# Patient Record
Sex: FEMALE | Race: WHITE | URBAN - METROPOLITAN AREA
[De-identification: names, ages, dates, MRNs, and addresses within clinical notes are randomized per-mention and may not be internally consistent; named-entity substitution may affect disease eponyms.]

---

## 2018-02-12 PROBLEM — G89.29 CHRONIC PAIN OF RIGHT KNEE: Status: ACTIVE | Noted: 2018-02-12

## 2018-02-12 PROBLEM — M85.80 OSTEOPENIA, UNSPECIFIED LOCATION: Status: ACTIVE | Noted: 2018-02-12

## 2018-02-12 PROBLEM — E55.9 VITAMIN D DEFICIENCY: Status: ACTIVE | Noted: 2018-02-12

## 2018-02-12 PROBLEM — A60.00 GENITAL HERPES SIMPLEX, UNSPECIFIED SITE: Status: ACTIVE | Noted: 2018-02-12

## 2018-02-12 PROBLEM — M25.561 CHRONIC PAIN OF RIGHT KNEE: Status: ACTIVE | Noted: 2018-02-12

## 2019-02-05 ENCOUNTER — WALK IN (OUTPATIENT)
Dept: URGENT CARE | Age: 69
End: 2019-02-05

## 2019-02-05 DIAGNOSIS — Z23 NEED FOR INFLUENZA VACCINATION: Primary | ICD-10-CM

## 2019-02-05 PROCEDURE — 90686 IIV4 VACC NO PRSV 0.5 ML IM: CPT | Performed by: NURSE PRACTITIONER

## 2019-02-05 PROCEDURE — G0008 ADMIN INFLUENZA VIRUS VAC: HCPCS | Performed by: NURSE PRACTITIONER

## 2019-02-21 PROCEDURE — 81001 URINALYSIS AUTO W/SCOPE: CPT | Performed by: INTERNAL MEDICINE

## 2019-05-23 ENCOUNTER — TELEPHONE (OUTPATIENT)
Dept: INTERNAL MEDICINE CLINIC | Facility: CLINIC | Age: 69
End: 2019-05-23

## 2019-05-23 NOTE — TELEPHONE ENCOUNTER
Pt requests mammogram order is sent to [facility], fax# [phone], so she can schedule her mammogram

## 2019-05-23 NOTE — TELEPHONE ENCOUNTER
Mammogram order faxed to [facility] as requested by patient, confirmation received. Left message on patient cell (OK per HIPPA) notifying her that order was faxed. Encouraged patient to call [facility] to confirm they received the order.  Advised patient in VM

## 2019-06-24 ENCOUNTER — TELEPHONE (OUTPATIENT)
Dept: INTERNAL MEDICINE CLINIC | Facility: CLINIC | Age: 69
End: 2019-06-24

## 2019-06-24 DIAGNOSIS — G89.29 CHRONIC LOW BACK PAIN WITH SCIATICA, SCIATICA LATERALITY UNSPECIFIED, UNSPECIFIED BACK PAIN LATERALITY: Primary | ICD-10-CM

## 2019-06-24 DIAGNOSIS — M54.40 CHRONIC LOW BACK PAIN WITH SCIATICA, SCIATICA LATERALITY UNSPECIFIED, UNSPECIFIED BACK PAIN LATERALITY: Primary | ICD-10-CM

## 2019-06-24 NOTE — TELEPHONE ENCOUNTER
[facility] calling to get an order faxed to them for PT.     Fax # [phone]  Phone # [phone]

## 2019-06-24 NOTE — TELEPHONE ENCOUNTER
To Dr. Emmanuel Broussard----    Referral pended for your review. Please send back to RN pool to fax.

## 2020-02-25 ENCOUNTER — LAB ENCOUNTER (OUTPATIENT)
Dept: LAB | Age: 70
End: 2020-02-25
Attending: INTERNAL MEDICINE
Payer: MEDICARE

## 2020-02-25 ENCOUNTER — OFFICE VISIT (OUTPATIENT)
Dept: INTERNAL MEDICINE CLINIC | Facility: CLINIC | Age: 70
End: 2020-02-25
Payer: MEDICARE

## 2020-02-25 VITALS
SYSTOLIC BLOOD PRESSURE: 130 MMHG | BODY MASS INDEX: 26.39 KG/M2 | HEIGHT: 61.69 IN | TEMPERATURE: 97 F | WEIGHT: 143.38 LBS | OXYGEN SATURATION: 98 % | HEART RATE: 80 BPM | DIASTOLIC BLOOD PRESSURE: 82 MMHG

## 2020-02-25 DIAGNOSIS — E55.9 VITAMIN D DEFICIENCY: ICD-10-CM

## 2020-02-25 DIAGNOSIS — A60.00 GENITAL HERPES SIMPLEX, UNSPECIFIED SITE: ICD-10-CM

## 2020-02-25 DIAGNOSIS — Z00.00 PHYSICAL EXAM: ICD-10-CM

## 2020-02-25 DIAGNOSIS — M85.80 OSTEOPENIA, UNSPECIFIED LOCATION: ICD-10-CM

## 2020-02-25 DIAGNOSIS — Z00.00 PHYSICAL EXAM: Primary | ICD-10-CM

## 2020-02-25 DIAGNOSIS — E78.5 HYPERLIPIDEMIA, UNSPECIFIED HYPERLIPIDEMIA TYPE: ICD-10-CM

## 2020-02-25 LAB
ALBUMIN SERPL-MCNC: 4.4 G/DL (ref 3.4–5)
ALBUMIN/GLOB SERPL: 1.4 {RATIO} (ref 1–2)
ALP LIVER SERPL-CCNC: 72 U/L (ref 55–142)
ALT SERPL-CCNC: 23 U/L (ref 13–56)
ANION GAP SERPL CALC-SCNC: 3 MMOL/L (ref 0–18)
AST SERPL-CCNC: 18 U/L (ref 15–37)
BASOPHILS # BLD AUTO: 0.07 X10(3) UL (ref 0–0.2)
BASOPHILS NFR BLD AUTO: 1.5 %
BILIRUB SERPL-MCNC: 0.6 MG/DL (ref 0.1–2)
BUN BLD-MCNC: 18 MG/DL (ref 7–18)
BUN/CREAT SERPL: 20.9 (ref 10–20)
CALCIUM BLD-MCNC: 9.8 MG/DL (ref 8.5–10.1)
CHLORIDE SERPL-SCNC: 107 MMOL/L (ref 98–112)
CHOLEST SMN-MCNC: 223 MG/DL (ref ?–200)
CO2 SERPL-SCNC: 31 MMOL/L (ref 21–32)
CREAT BLD-MCNC: 0.86 MG/DL (ref 0.55–1.02)
DEPRECATED RDW RBC AUTO: 37.5 FL (ref 35.1–46.3)
EOSINOPHIL # BLD AUTO: 0.18 X10(3) UL (ref 0–0.7)
EOSINOPHIL NFR BLD AUTO: 3.8 %
ERYTHROCYTE [DISTWIDTH] IN BLOOD BY AUTOMATED COUNT: 11.7 % (ref 11–15)
EST. AVERAGE GLUCOSE BLD GHB EST-MCNC: 97 MG/DL (ref 68–126)
GLOBULIN PLAS-MCNC: 3.1 G/DL (ref 2.8–4.4)
GLUCOSE BLD-MCNC: 92 MG/DL (ref 70–99)
HBA1C MFR BLD HPLC: 5 % (ref ?–5.7)
HCT VFR BLD AUTO: 44.2 % (ref 35–48)
HDLC SERPL-MCNC: 64 MG/DL (ref 40–59)
HGB BLD-MCNC: 15.1 G/DL (ref 12–16)
IMM GRANULOCYTES # BLD AUTO: 0.01 X10(3) UL (ref 0–1)
IMM GRANULOCYTES NFR BLD: 0.2 %
LDLC SERPL CALC-MCNC: 123 MG/DL (ref ?–100)
LYMPHOCYTES # BLD AUTO: 1.31 X10(3) UL (ref 1–4)
LYMPHOCYTES NFR BLD AUTO: 27.3 %
M PROTEIN MFR SERPL ELPH: 7.5 G/DL (ref 6.4–8.2)
MCH RBC QN AUTO: 30.1 PG (ref 26–34)
MCHC RBC AUTO-ENTMCNC: 34.2 G/DL (ref 31–37)
MCV RBC AUTO: 88 FL (ref 80–100)
MONOCYTES # BLD AUTO: 0.37 X10(3) UL (ref 0.1–1)
MONOCYTES NFR BLD AUTO: 7.7 %
NEUTROPHILS # BLD AUTO: 2.85 X10 (3) UL (ref 1.5–7.7)
NEUTROPHILS # BLD AUTO: 2.85 X10(3) UL (ref 1.5–7.7)
NEUTROPHILS NFR BLD AUTO: 59.5 %
NONHDLC SERPL-MCNC: 159 MG/DL (ref ?–130)
OSMOLALITY SERPL CALC.SUM OF ELEC: 294 MOSM/KG (ref 275–295)
PATIENT FASTING Y/N/NP: YES
PATIENT FASTING Y/N/NP: YES
PLATELET # BLD AUTO: 331 10(3)UL (ref 150–450)
POTASSIUM SERPL-SCNC: 4.1 MMOL/L (ref 3.5–5.1)
RBC # BLD AUTO: 5.02 X10(6)UL (ref 3.8–5.3)
SODIUM SERPL-SCNC: 141 MMOL/L (ref 136–145)
TRIGL SERPL-MCNC: 178 MG/DL (ref 30–149)
TSI SER-ACNC: 2.93 MIU/ML (ref 0.36–3.74)
VLDLC SERPL CALC-MCNC: 36 MG/DL (ref 0–30)
WBC # BLD AUTO: 4.8 X10(3) UL (ref 4–11)

## 2020-02-25 PROCEDURE — 36415 COLL VENOUS BLD VENIPUNCTURE: CPT

## 2020-02-25 PROCEDURE — G0439 PPPS, SUBSEQ VISIT: HCPCS | Performed by: INTERNAL MEDICINE

## 2020-02-25 PROCEDURE — G0463 HOSPITAL OUTPT CLINIC VISIT: HCPCS | Performed by: INTERNAL MEDICINE

## 2020-02-25 PROCEDURE — 85025 COMPLETE CBC W/AUTO DIFF WBC: CPT

## 2020-02-25 PROCEDURE — 82306 VITAMIN D 25 HYDROXY: CPT

## 2020-02-25 PROCEDURE — 99213 OFFICE O/P EST LOW 20 MIN: CPT | Performed by: INTERNAL MEDICINE

## 2020-02-25 PROCEDURE — 84443 ASSAY THYROID STIM HORMONE: CPT

## 2020-02-25 PROCEDURE — 80053 COMPREHEN METABOLIC PANEL: CPT

## 2020-02-25 PROCEDURE — 80061 LIPID PANEL: CPT

## 2020-02-25 PROCEDURE — 83036 HEMOGLOBIN GLYCOSYLATED A1C: CPT

## 2020-02-25 RX ORDER — ACYCLOVIR 400 MG/1
400 TABLET ORAL 3 TIMES DAILY
Qty: 30 TABLET | Refills: 6 | Status: SHIPPED | OUTPATIENT
Start: 2020-02-25 | End: 2021-07-27

## 2020-02-25 NOTE — PROGRESS NOTES
Mayola Osgood is a 68year old female who presents for a Medicare Annual Wellness visit. Generally feels well. No longer taking Ca--felt some nausea, still taking vit D. Patient tries to be more active by walking.     She has had a few episodes of genit Problems?: No      Fall/Risk Assessment          Have you fallen in the last 12 months?: 0-No                                        Fall/Risk Scorin          Depression Screening (PHQ-2/PHQ-9): Over the LAST 2 WEEKS   Little interest or pleasure in do any previous visit. No flowsheet data found. Pap and Pelvic      Pap: Every 3 yrs age 21-68 or Pap+HPV every 5 yrs age 33-67, age 72 and older at high risk   There are no preventive care reminders to display for this patient.  Update Notifixious i COPD      Spirometry Testing Annually No results found for this or any previous visit. No flowsheet data found.       ALLERGIES:     Morphine                SHORTNESS OF BREATH  Oxycodone               SHORTNESS OF BREATH  Alendronic Acid         NAUSEA O Temp 97.3 °F (36.3 °C) (Oral)   Ht 5' 1.69\" (1.567 m)   Wt 143 lb 6.4 oz (65 kg)   LMP  (LMP Unknown)   SpO2 98%   BMI 26.49 kg/m²      > Wt Readings from Last 6 Encounters:  02/25/20 : 143 lb 6.4 oz (65 kg)  02/21/19 : 146 lb 6.4 oz (66.4 kg)  02/12/18 unspecified site    Hyperlipidemia, unspecified hyperlipidemia type    Other orders  -     acyclovir 400 MG Oral Tab; Take 1 tablet (400 mg total) by mouth 3 (three) times daily. Patient does have known osteopenia by bone densitometry done in 2018.   She

## 2020-02-26 LAB — 25(OH)D3 SERPL-MCNC: 41.9 NG/ML (ref 30–100)

## 2020-09-28 ENCOUNTER — TELEPHONE (OUTPATIENT)
Dept: INTERNAL MEDICINE CLINIC | Facility: CLINIC | Age: 70
End: 2020-09-28

## 2020-09-28 DIAGNOSIS — Z12.31 ENCOUNTER FOR SCREENING MAMMOGRAM FOR BREAST CANCER: Primary | ICD-10-CM

## 2020-09-28 NOTE — TELEPHONE ENCOUNTER
Pt returned call  FAX number located in message from last year when order was faxed  [facility]  [facility]#[phone]  Tasked to nursing

## 2020-09-28 NOTE — TELEPHONE ENCOUNTER
Pt. Called stating she needs Dr. Quita Gonzalez to order her annual screening mammogram (breast imaging). Pt. States she has her mammo's done at [facility] and she is asking to have the order emailed directly  to [facility].    The email is abran

## 2020-09-28 NOTE — TELEPHONE ENCOUNTER
Please advise on mammogram order - we need to fax to [facility] and getfax number from patient thanks - to DR. DSOUZA

## 2020-09-28 NOTE — TELEPHONE ENCOUNTER
Order for mammogram entered . Left message to call back.    Please ask for fax number for [facility]

## 2020-10-21 ENCOUNTER — TELEPHONE (OUTPATIENT)
Dept: INTERNAL MEDICINE CLINIC | Facility: CLINIC | Age: 70
End: 2020-10-21

## 2020-10-21 NOTE — TELEPHONE ENCOUNTER
Her risk is very low. Unless she was in close proximity to that person for over 15 minutes (especially if unmasked), she is low risk for transmission. Would just monitor for symptoms.

## 2020-10-21 NOTE — TELEPHONE ENCOUNTER
Pt. Called stating she was volunteering at her [employer] for a .  She was the  at the door.   She made sure the guests were on the approved list, she made  making sure people were masked and she was making sure everyone's hands were sanitized, by

## 2020-10-21 NOTE — TELEPHONE ENCOUNTER
Please advise for DR. DSOUZA patient - called patient who has no symptoms at all - to DR. Kitty Ochoa

## 2020-10-26 NOTE — TELEPHONE ENCOUNTER
She could not listen to the voicemail from Dr. Shavonne Villavicencio. She is calling back to get more information from a doctor. She was at a  and she was the  lady. She did not spend 15 minutes with anyone.  She checks them in closer than 6 feet apart but

## 2020-10-26 NOTE — TELEPHONE ENCOUNTER
Renee Marios with Ms. Balta Yanely regarding her concerns. States that she did come into contact with someone that was Covid positive on 10/17 was tested 10/22 for Covid which was negative. Was asking if she could babysit this Friday.   Has been asymptomatic and afebr

## 2020-10-26 NOTE — TELEPHONE ENCOUNTER
Patient is calling to leave a message for Dr Socorro Dsouza. Patient states she was tested for Covid on 10/22/2020, has tested negative, but has further questions. Patient is wondering if she should have another test at this time.  Patient was informed per a friend

## 2020-10-26 NOTE — TELEPHONE ENCOUNTER
Pt. Called back she wasn't able to retrieve Dr. Yue Nunez message please advise ph.  # [phone]   Routed to clinical

## 2020-10-26 NOTE — TELEPHONE ENCOUNTER
Left extended message for pt.   Does not need to be quarantined unless patient had significant exposure of more than 15 minutes and less than 6 feet from known infected person

## 2021-03-15 DIAGNOSIS — Z23 NEED FOR VACCINATION: ICD-10-CM

## 2021-03-24 ENCOUNTER — LAB ENCOUNTER (OUTPATIENT)
Dept: LAB | Age: 71
End: 2021-03-24
Attending: INTERNAL MEDICINE
Payer: MEDICARE

## 2021-03-24 ENCOUNTER — OFFICE VISIT (OUTPATIENT)
Dept: INTERNAL MEDICINE CLINIC | Facility: CLINIC | Age: 71
End: 2021-03-24
Payer: MEDICARE

## 2021-03-24 VITALS
TEMPERATURE: 98 F | HEART RATE: 84 BPM | BODY MASS INDEX: 26.87 KG/M2 | WEIGHT: 146 LBS | OXYGEN SATURATION: 99 % | HEIGHT: 62 IN | DIASTOLIC BLOOD PRESSURE: 82 MMHG | SYSTOLIC BLOOD PRESSURE: 130 MMHG

## 2021-03-24 DIAGNOSIS — Z00.00 PHYSICAL EXAM: Primary | ICD-10-CM

## 2021-03-24 DIAGNOSIS — G89.29 CHRONIC PAIN OF RIGHT KNEE: ICD-10-CM

## 2021-03-24 DIAGNOSIS — E55.9 VITAMIN D DEFICIENCY: ICD-10-CM

## 2021-03-24 DIAGNOSIS — A60.00 GENITAL HERPES SIMPLEX, UNSPECIFIED SITE: ICD-10-CM

## 2021-03-24 DIAGNOSIS — Z78.0 ASYMPTOMATIC MENOPAUSAL STATE: ICD-10-CM

## 2021-03-24 DIAGNOSIS — M85.80 OSTEOPENIA, UNSPECIFIED LOCATION: ICD-10-CM

## 2021-03-24 DIAGNOSIS — Z00.00 PHYSICAL EXAM: ICD-10-CM

## 2021-03-24 DIAGNOSIS — M25.561 CHRONIC PAIN OF RIGHT KNEE: ICD-10-CM

## 2021-03-24 LAB
ALBUMIN SERPL-MCNC: 4.3 G/DL (ref 3.4–5)
ALBUMIN/GLOB SERPL: 1.3 {RATIO} (ref 1–2)
ALP LIVER SERPL-CCNC: 68 U/L
ALT SERPL-CCNC: 23 U/L
ANION GAP SERPL CALC-SCNC: 4 MMOL/L (ref 0–18)
AST SERPL-CCNC: 12 U/L (ref 15–37)
BASOPHILS # BLD AUTO: 0.08 X10(3) UL (ref 0–0.2)
BASOPHILS NFR BLD AUTO: 1.7 %
BILIRUB SERPL-MCNC: 0.6 MG/DL (ref 0.1–2)
BUN BLD-MCNC: 19 MG/DL (ref 7–18)
BUN/CREAT SERPL: 20.9 (ref 10–20)
CALCIUM BLD-MCNC: 9.7 MG/DL (ref 8.5–10.1)
CHLORIDE SERPL-SCNC: 108 MMOL/L (ref 98–112)
CHOLEST SMN-MCNC: 226 MG/DL (ref ?–200)
CO2 SERPL-SCNC: 27 MMOL/L (ref 21–32)
CREAT BLD-MCNC: 0.91 MG/DL
DEPRECATED RDW RBC AUTO: 38.8 FL (ref 35.1–46.3)
EOSINOPHIL # BLD AUTO: 0.21 X10(3) UL (ref 0–0.7)
EOSINOPHIL NFR BLD AUTO: 4.5 %
ERYTHROCYTE [DISTWIDTH] IN BLOOD BY AUTOMATED COUNT: 12 % (ref 11–15)
GLOBULIN PLAS-MCNC: 3.3 G/DL (ref 2.8–4.4)
GLUCOSE BLD-MCNC: 94 MG/DL (ref 70–99)
HCT VFR BLD AUTO: 45.1 %
HDLC SERPL-MCNC: 69 MG/DL (ref 40–59)
HGB BLD-MCNC: 15.2 G/DL
IMM GRANULOCYTES # BLD AUTO: 0 X10(3) UL (ref 0–1)
IMM GRANULOCYTES NFR BLD: 0 %
LDLC SERPL CALC-MCNC: 135 MG/DL (ref ?–100)
LYMPHOCYTES # BLD AUTO: 1.23 X10(3) UL (ref 1–4)
LYMPHOCYTES NFR BLD AUTO: 26.6 %
M PROTEIN MFR SERPL ELPH: 7.6 G/DL (ref 6.4–8.2)
MCH RBC QN AUTO: 29.6 PG (ref 26–34)
MCHC RBC AUTO-ENTMCNC: 33.7 G/DL (ref 31–37)
MCV RBC AUTO: 87.9 FL
MONOCYTES # BLD AUTO: 0.32 X10(3) UL (ref 0.1–1)
MONOCYTES NFR BLD AUTO: 6.9 %
NEUTROPHILS # BLD AUTO: 2.78 X10 (3) UL (ref 1.5–7.7)
NEUTROPHILS # BLD AUTO: 2.78 X10(3) UL (ref 1.5–7.7)
NEUTROPHILS NFR BLD AUTO: 60.3 %
NONHDLC SERPL-MCNC: 157 MG/DL (ref ?–130)
OSMOLALITY SERPL CALC.SUM OF ELEC: 290 MOSM/KG (ref 275–295)
PATIENT FASTING Y/N/NP: YES
PATIENT FASTING Y/N/NP: YES
PLATELET # BLD AUTO: 344 10(3)UL (ref 150–450)
POTASSIUM SERPL-SCNC: 4.2 MMOL/L (ref 3.5–5.1)
RBC # BLD AUTO: 5.13 X10(6)UL
SODIUM SERPL-SCNC: 139 MMOL/L (ref 136–145)
TRIGL SERPL-MCNC: 108 MG/DL (ref 30–149)
TSI SER-ACNC: 2.87 MIU/ML (ref 0.36–3.74)
VLDLC SERPL CALC-MCNC: 22 MG/DL (ref 0–30)
WBC # BLD AUTO: 4.6 X10(3) UL (ref 4–11)

## 2021-03-24 PROCEDURE — 85025 COMPLETE CBC W/AUTO DIFF WBC: CPT

## 2021-03-24 PROCEDURE — 84443 ASSAY THYROID STIM HORMONE: CPT

## 2021-03-24 PROCEDURE — 99213 OFFICE O/P EST LOW 20 MIN: CPT | Performed by: INTERNAL MEDICINE

## 2021-03-24 PROCEDURE — 36415 COLL VENOUS BLD VENIPUNCTURE: CPT

## 2021-03-24 PROCEDURE — 82306 VITAMIN D 25 HYDROXY: CPT

## 2021-03-24 PROCEDURE — 80061 LIPID PANEL: CPT

## 2021-03-24 PROCEDURE — 80053 COMPREHEN METABOLIC PANEL: CPT

## 2021-03-24 PROCEDURE — G0439 PPPS, SUBSEQ VISIT: HCPCS | Performed by: INTERNAL MEDICINE

## 2021-03-24 RX ORDER — ASPIRIN 81 MG/1
TABLET, CHEWABLE ORAL DAILY
COMMUNITY

## 2021-03-24 NOTE — PROGRESS NOTES
Yrn Hitchcock is a 65year old female who presents for a Medicare Annual Wellness visit. Generally feels well. Patient was unable to continue taking calcium feeling that it caused her constipation. She is compliant with daily vitamin D.     She denies c or bathing?: No    Problems with daily activities? : No    Memory Problems?: No      Fall/Risk Assessment          Have you fallen in the last 12 months?: 0-No                                        Fall/Risk Scorin            Depression Screening (PHQ found for this or any previous visit. No flowsheet data found. Pap and Pelvic      Pap: Every 3 yrs age 21-68 or Pap+HPV every 5 yrs age 33-67, age 72 and older at high risk   There are no preventive care reminders to display for this patient.  Update Spirometry Testing Annually No results found for this or any previous visit. No flowsheet data found.       ALLERGIES:     Morphine                SHORTNESS OF BREATH  Oxycodone               SHORTNESS OF BREATH  Alendronic Acid         NAUSEA ONLY anxiety  HEMATOLOGIC: denies hx of anemia  ENDOCRINE: denies thyroid history  ALL/ASTHMA: denies hx of allergy or asthma    EXAM:   /82   Pulse 84   Temp 98.4 °F (36.9 °C)   Ht 5' 2\" (1.575 m)   Wt 146 lb (66.2 kg)   LMP  (LMP Unknown)   SpO2 99% Future    Osteopenia, unspecified location  -     XR DEXA BONE DENSITOMETRY (CPT=77080); Future    Chronic pain of right knee    Genital herpes simplex, unspecified site    Asymptomatic menopausal state   -     XR DEXA BONE DENSITOMETRY (CPT=77080);  Future 11/18/2019   • Pneumovax 23 07/07/2015   • TDAP 07/07/2015       Template: Angela COCHRAN/ Nasir Dimas. [place]- [facility] ASSESSMENT [[id number]]

## 2021-03-26 LAB — 25(OH)D3 SERPL-MCNC: 53.3 NG/ML (ref 30–100)

## 2021-07-28 RX ORDER — ACYCLOVIR 400 MG/1
400 TABLET ORAL 3 TIMES DAILY
Qty: 30 TABLET | Refills: 5 | Status: SHIPPED | OUTPATIENT
Start: 2021-07-28

## 2021-09-13 ENCOUNTER — TELEPHONE (OUTPATIENT)
Dept: INTERNAL MEDICINE CLINIC | Facility: CLINIC | Age: 71
End: 2021-09-13

## 2021-09-13 NOTE — TELEPHONE ENCOUNTER
[facility] faxed an alternative request for Acyclovir 90 day supply fax.  # [phone]  Routed to Rx

## 2021-10-07 ENCOUNTER — OFFICE VISIT (OUTPATIENT)
Dept: INTERNAL MEDICINE CLINIC | Facility: CLINIC | Age: 71
End: 2021-10-07
Payer: MEDICARE

## 2021-10-07 VITALS
TEMPERATURE: 99 F | OXYGEN SATURATION: 98 % | HEIGHT: 62 IN | WEIGHT: 146.63 LBS | SYSTOLIC BLOOD PRESSURE: 142 MMHG | DIASTOLIC BLOOD PRESSURE: 86 MMHG | BODY MASS INDEX: 26.98 KG/M2 | HEART RATE: 89 BPM

## 2021-10-07 DIAGNOSIS — E55.9 VITAMIN D DEFICIENCY: ICD-10-CM

## 2021-10-07 DIAGNOSIS — R03.0 ELEVATED BLOOD PRESSURE READING: ICD-10-CM

## 2021-10-07 DIAGNOSIS — M85.80 OSTEOPENIA, UNSPECIFIED LOCATION: ICD-10-CM

## 2021-10-07 DIAGNOSIS — M25.551 RIGHT HIP PAIN: Primary | ICD-10-CM

## 2021-10-07 PROCEDURE — 99213 OFFICE O/P EST LOW 20 MIN: CPT | Performed by: INTERNAL MEDICINE

## 2021-10-07 RX ORDER — MELOXICAM 15 MG/1
15 TABLET ORAL DAILY
Qty: 30 TABLET | Refills: 0 | Status: SHIPPED | OUTPATIENT
Start: 2021-10-07

## 2021-10-07 NOTE — PROGRESS NOTES
HPI:    Patient ID: Ravin Damon is a 66year old female. Presents with c/o right hip pain that has been ongoing over the last 2-3 years.   However she feels that it is progressively getting worse  She is concerned because she is taking an [place] trip an Pulmonary effort is normal. No respiratory distress. Breath sounds: Normal breath sounds. Abdominal:      General: There is no distension. Palpations: Abdomen is soft. There is no mass. Tenderness: There is no abdominal tenderness.    Muscu regularly scheduled appointment. No orders of the defined types were placed in this encounter.       Meds This Visit:  Requested Prescriptions     Signed Prescriptions Disp Refills   • Meloxicam 15 MG Oral Tab 30 tablet 0     Sig: Take 1 tablet (15 mg

## 2021-10-08 ENCOUNTER — TELEPHONE (OUTPATIENT)
Dept: INTERNAL MEDICINE CLINIC | Facility: CLINIC | Age: 71
End: 2021-10-08

## 2021-10-11 ENCOUNTER — HOSPITAL ENCOUNTER (OUTPATIENT)
Dept: GENERAL RADIOLOGY | Age: 71
Discharge: HOME | End: 2021-10-11
Attending: INTERNAL MEDICINE
Payer: MEDICARE

## 2021-10-11 DIAGNOSIS — M25.551 RIGHT HIP PAIN: ICD-10-CM

## 2021-10-11 PROCEDURE — 73502 X-RAY EXAM HIP UNI 2-3 VIEWS: CPT | Performed by: INTERNAL MEDICINE

## 2021-10-11 RX ORDER — ACYCLOVIR 400 MG/1
TABLET ORAL
Qty: 90 TABLET | Refills: 1 | OUTPATIENT
Start: 2021-10-11

## 2021-10-12 ENCOUNTER — TELEPHONE (OUTPATIENT)
Dept: INTERNAL MEDICINE CLINIC | Facility: CLINIC | Age: 71
End: 2021-10-12

## 2021-10-12 DIAGNOSIS — M25.559 HIP PAIN: Primary | ICD-10-CM

## 2021-10-12 NOTE — TELEPHONE ENCOUNTER
Patient called to relay Dr Wiliam Drew message below. PT referral in Dr Wiliam Drew outbox. Hien-     Right hip x-ray only shows small arthritic changes. No fractures or other abnormalities are seen.   Therefore your discomfort is most likely related to muscul

## 2021-10-12 NOTE — TELEPHONE ENCOUNTER
Patient called and relayed Dr Bereket Wilburn message. Patient verbalized understanding with no further questions noted. PT referral mailed to patient's confirmed home address.

## 2021-10-19 ENCOUNTER — TELEPHONE (OUTPATIENT)
Dept: INTERNAL MEDICINE CLINIC | Facility: CLINIC | Age: 71
End: 2021-10-19

## 2021-10-19 DIAGNOSIS — Z11.52 ENCOUNTER FOR SCREENING FOR COVID-19: Primary | ICD-10-CM

## 2021-10-19 NOTE — TELEPHONE ENCOUNTER
Spoke to patient who confirms she has no symptoms and just needs a COVID test for travel. Ordered per protocol and explained how to schedule, how long results take and that they will be available via my chart.  Changed my chart password with patient on the

## 2021-10-19 NOTE — TELEPHONE ENCOUNTER
Pt travelling to [facility] on Oct 30 &  needs covid test 72 hours prior to travel    Requests order, would like to have test done at [facility] location on [address] [facility]

## 2021-10-27 ENCOUNTER — LAB ENCOUNTER (OUTPATIENT)
Dept: LAB | Facility: HOSPITAL | Age: 71
End: 2021-10-27
Attending: INTERNAL MEDICINE
Payer: MEDICARE

## 2021-10-27 DIAGNOSIS — Z11.52 ENCOUNTER FOR SCREENING FOR COVID-19: ICD-10-CM

## 2021-10-29 NOTE — TELEPHONE ENCOUNTER
Patient is calling to check on status of Covid Test done 10/27    Patient will be leaving the country on 10/30 at 7:30 pm    Patient is asking when she will receive the results  [facility]    Patient is overwhelmed and would like to s

## 2021-11-03 NOTE — TELEPHONE ENCOUNTER
Prescribed 10/7 for first time for R hip pain. \"Since it is most likely that her symptoms are related to osteoarthritis, will try to give short course of meloxicam 15 mg daily for 2 weeks consistently.   If there is no improvement, may then need further

## 2021-11-10 RX ORDER — MELOXICAM 15 MG/1
TABLET ORAL
Qty: 30 TABLET | Refills: 0 | OUTPATIENT
Start: 2021-11-10

## 2021-11-10 NOTE — TELEPHONE ENCOUNTER
Noted    Current refill request refused due to refill is either a duplicate request or has active refills at the pharmacy. Check previous templates.     Requested Prescriptions     Refused Prescriptions Disp Refills   • MELOXICAM 15 MG Oral Tab [Pharmacy M

## 2021-11-11 ENCOUNTER — PATIENT MESSAGE (OUTPATIENT)
Dept: INTERNAL MEDICINE CLINIC | Facility: CLINIC | Age: 71
End: 2021-11-11

## 2022-01-14 ENCOUNTER — TELEPHONE (OUTPATIENT)
Dept: INTERNAL MEDICINE CLINIC | Facility: CLINIC | Age: 72
End: 2022-01-14

## 2022-01-14 DIAGNOSIS — Z12.31 ENCOUNTER FOR SCREENING MAMMOGRAM FOR BREAST CANCER: Primary | ICD-10-CM

## 2022-01-14 NOTE — TELEPHONE ENCOUNTER
Mammogram order entered per protocol and faxed to [facility] at [phone]-[id number] received

## 2022-01-14 NOTE — TELEPHONE ENCOUNTER
Patient is calling to request an order for a Mammogram   Patient will be using [facility], she will call back with a fax #

## 2022-04-12 ENCOUNTER — OFFICE VISIT (OUTPATIENT)
Dept: INTERNAL MEDICINE CLINIC | Facility: CLINIC | Age: 72
End: 2022-04-12
Payer: MEDICARE

## 2022-04-12 ENCOUNTER — LAB ENCOUNTER (OUTPATIENT)
Dept: LAB | Age: 72
End: 2022-04-12
Attending: INTERNAL MEDICINE
Payer: MEDICARE

## 2022-04-12 VITALS
OXYGEN SATURATION: 98 % | DIASTOLIC BLOOD PRESSURE: 82 MMHG | SYSTOLIC BLOOD PRESSURE: 140 MMHG | BODY MASS INDEX: 26.4 KG/M2 | TEMPERATURE: 98 F | HEIGHT: 61.1 IN | HEART RATE: 86 BPM | WEIGHT: 139.81 LBS

## 2022-04-12 DIAGNOSIS — Z00.00 PHYSICAL EXAM: ICD-10-CM

## 2022-04-12 DIAGNOSIS — E55.9 VITAMIN D DEFICIENCY: ICD-10-CM

## 2022-04-12 DIAGNOSIS — Z00.00 PHYSICAL EXAM: Primary | ICD-10-CM

## 2022-04-12 DIAGNOSIS — M85.80 OSTEOPENIA, UNSPECIFIED LOCATION: ICD-10-CM

## 2022-04-12 DIAGNOSIS — Z78.0 MENOPAUSE: ICD-10-CM

## 2022-04-12 DIAGNOSIS — E78.5 HYPERLIPIDEMIA, UNSPECIFIED HYPERLIPIDEMIA TYPE: ICD-10-CM

## 2022-04-12 LAB
ALBUMIN SERPL-MCNC: 4.6 G/DL (ref 3.4–5)
ALBUMIN/GLOB SERPL: 1.6 {RATIO} (ref 1–2)
ALP LIVER SERPL-CCNC: 75 U/L
ALT SERPL-CCNC: 25 U/L
ANION GAP SERPL CALC-SCNC: 5 MMOL/L (ref 0–18)
AST SERPL-CCNC: 19 U/L (ref 15–37)
BASOPHILS # BLD AUTO: 0.06 X10(3) UL (ref 0–0.2)
BASOPHILS NFR BLD AUTO: 1.4 %
BILIRUB SERPL-MCNC: 0.8 MG/DL (ref 0.1–2)
BUN BLD-MCNC: 19 MG/DL (ref 7–18)
BUN/CREAT SERPL: 20.2 (ref 10–20)
CALCIUM BLD-MCNC: 9.8 MG/DL (ref 8.5–10.1)
CHLORIDE SERPL-SCNC: 106 MMOL/L (ref 98–112)
CHOLEST SERPL-MCNC: 231 MG/DL (ref ?–200)
CO2 SERPL-SCNC: 29 MMOL/L (ref 21–32)
CREAT BLD-MCNC: 0.94 MG/DL
DEPRECATED RDW RBC AUTO: 38.5 FL (ref 35.1–46.3)
EOSINOPHIL # BLD AUTO: 0.14 X10(3) UL (ref 0–0.7)
EOSINOPHIL NFR BLD AUTO: 3.2 %
ERYTHROCYTE [DISTWIDTH] IN BLOOD BY AUTOMATED COUNT: 11.7 % (ref 11–15)
EST. AVERAGE GLUCOSE BLD GHB EST-MCNC: 100 MG/DL (ref 68–126)
FASTING PATIENT LIPID ANSWER: YES
FASTING STATUS PATIENT QL REPORTED: YES
GLOBULIN PLAS-MCNC: 2.9 G/DL (ref 2.8–4.4)
GLUCOSE BLD-MCNC: 88 MG/DL (ref 70–99)
HBA1C MFR BLD: 5.1 % (ref ?–5.7)
HCT VFR BLD AUTO: 45.8 %
HDLC SERPL-MCNC: 67 MG/DL (ref 40–59)
HGB BLD-MCNC: 15.4 G/DL
IMM GRANULOCYTES # BLD AUTO: 0 X10(3) UL (ref 0–1)
IMM GRANULOCYTES NFR BLD: 0 %
LDLC SERPL CALC-MCNC: 145 MG/DL (ref ?–100)
LYMPHOCYTES # BLD AUTO: 1.06 X10(3) UL (ref 1–4)
LYMPHOCYTES NFR BLD AUTO: 24 %
MCH RBC QN AUTO: 30.3 PG (ref 26–34)
MCHC RBC AUTO-ENTMCNC: 33.6 G/DL (ref 31–37)
MCV RBC AUTO: 90 FL
MONOCYTES # BLD AUTO: 0.35 X10(3) UL (ref 0.1–1)
MONOCYTES NFR BLD AUTO: 7.9 %
NEUTROPHILS # BLD AUTO: 2.81 X10 (3) UL (ref 1.5–7.7)
NEUTROPHILS # BLD AUTO: 2.81 X10(3) UL (ref 1.5–7.7)
NEUTROPHILS NFR BLD AUTO: 63.5 %
NONHDLC SERPL-MCNC: 164 MG/DL (ref ?–130)
OSMOLALITY SERPL CALC.SUM OF ELEC: 292 MOSM/KG (ref 275–295)
PLATELET # BLD AUTO: 340 10(3)UL (ref 150–450)
POTASSIUM SERPL-SCNC: 4.1 MMOL/L (ref 3.5–5.1)
PROT SERPL-MCNC: 7.5 G/DL (ref 6.4–8.2)
RBC # BLD AUTO: 5.09 X10(6)UL
SODIUM SERPL-SCNC: 140 MMOL/L (ref 136–145)
TRIGL SERPL-MCNC: 108 MG/DL (ref 30–149)
TSI SER-ACNC: 2.58 MIU/ML (ref 0.36–3.74)
VIT D+METAB SERPL-MCNC: 52.8 NG/ML (ref 30–100)
VLDLC SERPL CALC-MCNC: 20 MG/DL (ref 0–30)
WBC # BLD AUTO: 4.4 X10(3) UL (ref 4–11)

## 2022-04-12 PROCEDURE — 80053 COMPREHEN METABOLIC PANEL: CPT

## 2022-04-12 PROCEDURE — G0439 PPPS, SUBSEQ VISIT: HCPCS | Performed by: INTERNAL MEDICINE

## 2022-04-12 PROCEDURE — 84443 ASSAY THYROID STIM HORMONE: CPT

## 2022-04-12 PROCEDURE — 36415 COLL VENOUS BLD VENIPUNCTURE: CPT

## 2022-04-12 PROCEDURE — 80061 LIPID PANEL: CPT

## 2022-04-12 PROCEDURE — 85025 COMPLETE CBC W/AUTO DIFF WBC: CPT

## 2022-04-12 PROCEDURE — 82306 VITAMIN D 25 HYDROXY: CPT

## 2022-04-12 PROCEDURE — 83036 HEMOGLOBIN GLYCOSYLATED A1C: CPT

## 2022-04-12 PROCEDURE — 99213 OFFICE O/P EST LOW 20 MIN: CPT | Performed by: INTERNAL MEDICINE

## 2022-06-07 ENCOUNTER — HOSPITAL ENCOUNTER (OUTPATIENT)
Dept: BONE DENSITY | Facility: HOSPITAL | Age: 72
Discharge: HOME | End: 2022-06-07
Attending: INTERNAL MEDICINE
Payer: MEDICARE

## 2022-06-07 DIAGNOSIS — Z78.0 MENOPAUSE: ICD-10-CM

## 2022-06-07 PROCEDURE — 77080 DXA BONE DENSITY AXIAL: CPT | Performed by: INTERNAL MEDICINE

## 2022-06-08 RX ORDER — ALENDRONATE SODIUM 70 MG/1
70 TABLET ORAL
Qty: 4 TABLET | Refills: 3 | Status: SHIPPED | OUTPATIENT
Start: 2022-06-08

## 2022-07-25 ENCOUNTER — TELEPHONE (OUTPATIENT)
Dept: INTERNAL MEDICINE CLINIC | Facility: CLINIC | Age: 72
End: 2022-07-25

## 2022-07-25 RX ORDER — CALCIUM/D3/MAG/K2/MIN/HERB 326 333-32 MG
2 TABLET ORAL 2 TIMES DAILY
Qty: 1 TABLET | Refills: 0 | COMMUNITY
Start: 2022-07-25

## 2022-07-25 NOTE — TELEPHONE ENCOUNTER
Pt called for refill of Acyclovir 400 mg  Pt takes once daily when needed which seems to do the trick for her    Please send to [facility]

## 2022-07-25 NOTE — TELEPHONE ENCOUNTER
Please update pt's med list with the following     Algae Uriel (plant based calcium)   Patient takes 2 gel caps daily at lunch & dinner

## 2022-07-28 NOTE — TELEPHONE ENCOUNTER
To Raquel Howard, last RX for acyclovir sent by [facility] for 1 tab TID on 7/28/21, per below msg, pt takes 1 daily PRN. Please advise if we need to send a new RX?

## 2022-07-29 RX ORDER — ACYCLOVIR 400 MG/1
400 TABLET ORAL 3 TIMES DAILY
Qty: 30 TABLET | Refills: 5 | Status: SHIPPED | OUTPATIENT
Start: 2022-07-29

## 2022-08-12 RX ORDER — ACYCLOVIR 400 MG/1
TABLET ORAL
Qty: 90 TABLET | Refills: 1 | OUTPATIENT
Start: 2022-08-12

## 2022-08-17 ENCOUNTER — OFFICE VISIT (OUTPATIENT)
Dept: INTERNAL MEDICINE CLINIC | Facility: CLINIC | Age: 72
End: 2022-08-17
Payer: MEDICARE

## 2022-08-17 VITALS
BODY MASS INDEX: 26.81 KG/M2 | OXYGEN SATURATION: 98 % | HEIGHT: 61 IN | SYSTOLIC BLOOD PRESSURE: 142 MMHG | WEIGHT: 142 LBS | DIASTOLIC BLOOD PRESSURE: 82 MMHG | HEART RATE: 78 BPM

## 2022-08-17 DIAGNOSIS — R68.89 THROAT CONGESTION: Primary | ICD-10-CM

## 2022-08-17 DIAGNOSIS — M85.80 OSTEOPENIA, UNSPECIFIED LOCATION: ICD-10-CM

## 2022-08-17 DIAGNOSIS — E55.9 VITAMIN D DEFICIENCY: ICD-10-CM

## 2022-08-17 PROCEDURE — 99213 OFFICE O/P EST LOW 20 MIN: CPT | Performed by: INTERNAL MEDICINE

## 2022-08-22 ENCOUNTER — HOSPITAL ENCOUNTER (OUTPATIENT)
Dept: GENERAL RADIOLOGY | Age: 72
Discharge: HOME | End: 2022-08-22
Attending: INTERNAL MEDICINE
Payer: MEDICARE

## 2022-08-22 DIAGNOSIS — R68.89 THROAT CONGESTION: ICD-10-CM

## 2022-08-22 PROCEDURE — 71046 X-RAY EXAM CHEST 2 VIEWS: CPT | Performed by: INTERNAL MEDICINE

## 2022-09-12 ENCOUNTER — OFFICE VISIT (OUTPATIENT)
Dept: OTOLARYNGOLOGY | Facility: CLINIC | Age: 72
End: 2022-09-12
Payer: MEDICARE

## 2022-09-12 DIAGNOSIS — R49.0 DYSPHONIA: Primary | ICD-10-CM

## 2022-09-12 PROCEDURE — 99203 OFFICE O/P NEW LOW 30 MIN: CPT | Performed by: OTOLARYNGOLOGY

## 2022-09-12 PROCEDURE — 31575 DIAGNOSTIC LARYNGOSCOPY: CPT | Performed by: OTOLARYNGOLOGY

## 2022-09-12 RX ORDER — MELOXICAM 15 MG/1
15 TABLET ORAL DAILY
COMMUNITY
Start: 2021-10-07

## 2022-09-12 RX ORDER — LORATADINE 10 MG/1
10 TABLET ORAL DAILY
Qty: 30 TABLET | Refills: 3 | Status: SHIPPED | OUTPATIENT
Start: 2022-09-12

## 2022-09-12 RX ORDER — FLUTICASONE PROPIONATE 50 MCG
1 SPRAY, SUSPENSION (ML) NASAL 2 TIMES DAILY
Qty: 16 G | Refills: 3 | Status: SHIPPED | OUTPATIENT
Start: 2022-09-12

## 2022-09-12 RX ORDER — OMEPRAZOLE 40 MG/1
40 CAPSULE, DELAYED RELEASE ORAL DAILY
Qty: 30 CAPSULE | Refills: 2 | Status: SHIPPED | OUTPATIENT
Start: 2022-09-12

## 2022-09-12 RX ORDER — METHYLPREDNISOLONE 4 MG/1
TABLET ORAL
Qty: 21 TABLET | Refills: 0 | Status: SHIPPED | OUTPATIENT
Start: 2022-09-12

## 2022-09-12 RX ORDER — MONTELUKAST SODIUM 10 MG/1
10 TABLET ORAL NIGHTLY
Qty: 30 TABLET | Refills: 3 | Status: SHIPPED | OUTPATIENT
Start: 2022-09-12

## 2023-01-17 RX ORDER — LORATADINE 10 MG/1
TABLET ORAL
Qty: 90 TABLET | Refills: 1 | Status: SHIPPED | OUTPATIENT
Start: 2023-01-17

## 2023-04-11 RX ORDER — ACYCLOVIR 400 MG/1
400 TABLET ORAL 3 TIMES DAILY
Qty: 30 TABLET | Refills: 5 | OUTPATIENT
Start: 2023-04-11

## 2023-04-11 NOTE — TELEPHONE ENCOUNTER
Patient is calling and would like a refill for:      Acyclovir 400MG Oral Tab    [facility]    Patient only has 2 pills left

## 2023-04-26 ENCOUNTER — TELEPHONE (OUTPATIENT)
Dept: INTERNAL MEDICINE CLINIC | Facility: CLINIC | Age: 73
End: 2023-04-26

## 2023-04-26 DIAGNOSIS — Z12.31 ENCOUNTER FOR SCREENING MAMMOGRAM FOR BREAST CANCER: Primary | ICD-10-CM

## 2023-04-26 NOTE — TELEPHONE ENCOUNTER
Please call patient  She is requesting that mammogram order be sent to [facility]?     Telephone number for [facility]: [phone]  FAX#:  [phone]  Patient called previously, waiting for call back when order in place  Tasked to nursing

## 2023-04-26 NOTE — TELEPHONE ENCOUNTER
Previous mammogram order . Mammogram order entered per protocol. Faxed to [facility] at number below, confirmation received. Called pt and notified.

## 2023-05-11 ENCOUNTER — TELEPHONE (OUTPATIENT)
Dept: INTERNAL MEDICINE CLINIC | Facility: CLINIC | Age: 73
End: 2023-05-11

## 2023-05-11 NOTE — TELEPHONE ENCOUNTER
Vaccinated: Yes  [x]   No []  Booster:  Yes  [x]  No []      Patient states on Tuesday she started having  having dry cough, diarrhea, nasal drainage, post basal drip, headache, fatigue, and a scratchy throat. Patient denies sob, wheezing, chest pain, vomiting, abdominal pain. Patient states diarrhea was only on Tuesday and has since resolved. Patient states she was with her grandson on Sunday who had a cold. Patient wants to know if there is anything she can take to help with symptoms? Patient drinking but not eating as much. Taking Tylenol and Claritin. Patient has not tested for Covid waiting for her neighbor to come home from work to obtain a test from her.  To Dr. Tali Sargent please dvise

## 2023-05-11 NOTE — TELEPHONE ENCOUNTER
Spoke to pt and relayed MD message and instructions. Pt verbalized understanding and agrees with plan. She is planning on taking covid test today. She will call back if test is positive.

## 2023-05-11 NOTE — TELEPHONE ENCOUNTER
Patient called, hoping to hear back from the office today  Does patient need to be seen in the office tomorrow?

## 2023-05-11 NOTE — TELEPHONE ENCOUNTER
Patient definitely needs home COVID testing. If negative, would treat as routine viral illness with fluids, over-the-counter Mucinex D or Robitussin-DM to help with cough and congestion, Tylenol will help with fevers or chills. Normally this would be a self-limiting course lasting 3 to 7 days. Call if symptoms should worsen.

## 2023-05-11 NOTE — TELEPHONE ENCOUNTER
Please call patient she has had the flu since Tuesday, has had headaches, nausea, very tired, drinking water, no fever (99), nose runny (not sure sinus/allergy), scratchy throat  Patient was with her grandson who has a cold    Tasked to nursing

## 2023-05-12 ENCOUNTER — HOSPITAL ENCOUNTER (OUTPATIENT)
Age: 73
Discharge: HOME | End: 2023-05-12
Payer: MEDICARE

## 2023-05-12 VITALS
HEART RATE: 109 BPM | DIASTOLIC BLOOD PRESSURE: 87 MMHG | SYSTOLIC BLOOD PRESSURE: 128 MMHG | TEMPERATURE: 97 F | RESPIRATION RATE: 20 BRPM | OXYGEN SATURATION: 99 %

## 2023-05-12 DIAGNOSIS — U07.1 COVID-19: Primary | ICD-10-CM

## 2023-05-12 LAB — SARS-COV-2 RNA RESP QL NAA+PROBE: DETECTED

## 2023-05-12 NOTE — ED INITIAL ASSESSMENT (HPI)
Pt came in due to cough, congestion, runny nose, and headache for the past 3 days. Pt has easy non labored respirations.

## 2023-05-12 NOTE — TELEPHONE ENCOUNTER
I spoke to [facility] she walked over to the immediate care today to do a covid test because she was having trouble with the home covid test.    She tested positive. I asked if they prescribed her any medications and she says they told her to call her doctor. Her symptoms started on Tuesday, see documentation below. Today she \"doesn't feel great\" She feels tired. She has had diarrhea . Has runny nose, dry cough, headache  Denies nausea or vomiting but has poor appetite. Denies chest pain or tightness. Denies shortness of breath. She has had a headache since Tuesday. She is taking tylenol PRN      She wants to know if Dr Antoine Estes thinks she should be prescribed paxlovid? She wants to know specifically if she should take mucinex d or robitussin Dm? To Dr Antoine Estes to please advise  [facility] [address] [facility] isolation guidelines with her: Day 0 is first day of symptoms. Stay home for at least 5 days. If symptoms have resolved or are resolving after 5 days and also fever free for 24 hours with no fever reducing medication, can leave house but continue to wear a well fitting mask for 5 additional days. Do not go to places where you are unable to wear a mask until 10 days after your first day of symptoms. Do not travel until a full 10 days after your symptoms started. Reviewed importance of staying in a specific room and using a separate bathroom if living with others. Wear a well fitting mask if you have to be around others in your home. Reviewed importance of monitoring symptoms and to call the office with any new or worsening symptoms. Report to ER for any chest pain, shortness of breath, or high fever  Reviewed importance of resting and pushing fluids. Patient verbalized understanding of instructions.

## 2023-05-12 NOTE — TELEPHONE ENCOUNTER
Paxlovid ordered  Okay for patient to take Robitussin-DM or Mucinex D as needed to help with congestion and cough

## 2023-05-12 NOTE — TELEPHONE ENCOUNTER
Patient is calling back to inform the office that she tested positive for covid this morning at the [place] [address] location. Patient is wondering what she can/should do at this time to help with her symptoms/positive result.       # [phone]

## 2023-05-15 NOTE — TELEPHONE ENCOUNTER
Please call patient [phone]    Pt is taking Paxlovid, states she feels weak and nauseous    Headache & runny nose is gone     Pt has two more days left should she continue taking them

## 2023-05-15 NOTE — TELEPHONE ENCOUNTER
Called and spoke with patient. She states her severe headaches and runny nose are gone. She feels weakness and nausea after taking Paxlovid every morning. Today the feelings are still lasting. Should she finish the last two days of Paxlovid or can she stop taking it?     To Dr. Romelia Hubbard to please advise--

## 2023-05-24 ENCOUNTER — LAB ENCOUNTER (OUTPATIENT)
Dept: LAB | Age: 73
End: 2023-05-24
Attending: INTERNAL MEDICINE
Payer: MEDICARE

## 2023-05-24 ENCOUNTER — OFFICE VISIT (OUTPATIENT)
Dept: INTERNAL MEDICINE CLINIC | Facility: CLINIC | Age: 73
End: 2023-05-24

## 2023-05-24 VITALS
HEIGHT: 61 IN | SYSTOLIC BLOOD PRESSURE: 128 MMHG | WEIGHT: 138 LBS | OXYGEN SATURATION: 98 % | BODY MASS INDEX: 26.06 KG/M2 | DIASTOLIC BLOOD PRESSURE: 82 MMHG | TEMPERATURE: 98 F | HEART RATE: 75 BPM

## 2023-05-24 DIAGNOSIS — E55.9 VITAMIN D DEFICIENCY: ICD-10-CM

## 2023-05-24 DIAGNOSIS — M19.90 OSTEOARTHRITIS, UNSPECIFIED OSTEOARTHRITIS TYPE, UNSPECIFIED SITE: ICD-10-CM

## 2023-05-24 DIAGNOSIS — Z00.00 PHYSICAL EXAM: Primary | ICD-10-CM

## 2023-05-24 DIAGNOSIS — U07.1 COVID-19 VIRUS INFECTION: ICD-10-CM

## 2023-05-24 DIAGNOSIS — J30.2 SEASONAL ALLERGIES: ICD-10-CM

## 2023-05-24 DIAGNOSIS — Z00.00 PHYSICAL EXAM: ICD-10-CM

## 2023-05-24 DIAGNOSIS — E78.5 HYPERLIPIDEMIA, UNSPECIFIED HYPERLIPIDEMIA TYPE: ICD-10-CM

## 2023-05-24 DIAGNOSIS — M81.0 OSTEOPOROSIS, UNSPECIFIED OSTEOPOROSIS TYPE, UNSPECIFIED PATHOLOGICAL FRACTURE PRESENCE: ICD-10-CM

## 2023-05-24 DIAGNOSIS — A60.00 GENITAL HERPES SIMPLEX, UNSPECIFIED SITE: ICD-10-CM

## 2023-05-24 PROBLEM — M85.80 OSTEOPENIA, UNSPECIFIED LOCATION: Status: RESOLVED | Noted: 2018-02-12 | Resolved: 2023-05-24

## 2023-05-24 LAB
ALBUMIN SERPL-MCNC: 4.1 G/DL (ref 3.4–5)
ALBUMIN/GLOB SERPL: 1.4 {RATIO} (ref 1–2)
ALP LIVER SERPL-CCNC: 57 U/L
ALT SERPL-CCNC: 24 U/L
ANION GAP SERPL CALC-SCNC: 5 MMOL/L (ref 0–18)
AST SERPL-CCNC: 15 U/L (ref 15–37)
BASOPHILS # BLD AUTO: 0.07 X10(3) UL (ref 0–0.2)
BASOPHILS NFR BLD AUTO: 1.8 %
BILIRUB SERPL-MCNC: 0.5 MG/DL (ref 0.1–2)
BUN BLD-MCNC: 24 MG/DL (ref 7–18)
BUN/CREAT SERPL: 27.6 (ref 10–20)
CALCIUM BLD-MCNC: 9.4 MG/DL (ref 8.5–10.1)
CHLORIDE SERPL-SCNC: 108 MMOL/L (ref 98–112)
CHOLEST SERPL-MCNC: 203 MG/DL (ref ?–200)
CO2 SERPL-SCNC: 27 MMOL/L (ref 21–32)
CREAT BLD-MCNC: 0.87 MG/DL
DEPRECATED RDW RBC AUTO: 38.1 FL (ref 35.1–46.3)
EOSINOPHIL # BLD AUTO: 0.11 X10(3) UL (ref 0–0.7)
EOSINOPHIL NFR BLD AUTO: 2.8 %
ERYTHROCYTE [DISTWIDTH] IN BLOOD BY AUTOMATED COUNT: 11.7 % (ref 11–15)
EST. AVERAGE GLUCOSE BLD GHB EST-MCNC: 103 MG/DL (ref 68–126)
FASTING PATIENT LIPID ANSWER: YES
FASTING STATUS PATIENT QL REPORTED: YES
GFR SERPLBLD BASED ON 1.73 SQ M-ARVRAT: 70 ML/MIN/1.73M2 (ref 60–?)
GLOBULIN PLAS-MCNC: 3 G/DL (ref 2.8–4.4)
GLUCOSE BLD-MCNC: 95 MG/DL (ref 70–99)
HBA1C MFR BLD: 5.2 % (ref ?–5.7)
HCT VFR BLD AUTO: 42.2 %
HDLC SERPL-MCNC: 63 MG/DL (ref 40–59)
HGB BLD-MCNC: 14.1 G/DL
IMM GRANULOCYTES # BLD AUTO: 0 X10(3) UL (ref 0–1)
IMM GRANULOCYTES NFR BLD: 0 %
LDLC SERPL CALC-MCNC: 125 MG/DL (ref ?–100)
LYMPHOCYTES # BLD AUTO: 1.25 X10(3) UL (ref 1–4)
LYMPHOCYTES NFR BLD AUTO: 31.6 %
MCH RBC QN AUTO: 30.1 PG (ref 26–34)
MCHC RBC AUTO-ENTMCNC: 33.4 G/DL (ref 31–37)
MCV RBC AUTO: 90.2 FL
MONOCYTES # BLD AUTO: 0.45 X10(3) UL (ref 0.1–1)
MONOCYTES NFR BLD AUTO: 11.4 %
NEUTROPHILS # BLD AUTO: 2.07 X10 (3) UL (ref 1.5–7.7)
NEUTROPHILS # BLD AUTO: 2.07 X10(3) UL (ref 1.5–7.7)
NEUTROPHILS NFR BLD AUTO: 52.4 %
NONHDLC SERPL-MCNC: 140 MG/DL (ref ?–130)
OSMOLALITY SERPL CALC.SUM OF ELEC: 294 MOSM/KG (ref 275–295)
PLATELET # BLD AUTO: 384 10(3)UL (ref 150–450)
POTASSIUM SERPL-SCNC: 4.1 MMOL/L (ref 3.5–5.1)
PROT SERPL-MCNC: 7.1 G/DL (ref 6.4–8.2)
RBC # BLD AUTO: 4.68 X10(6)UL
SODIUM SERPL-SCNC: 140 MMOL/L (ref 136–145)
TRIGL SERPL-MCNC: 81 MG/DL (ref 30–149)
TSI SER-ACNC: 2.5 MIU/ML (ref 0.36–3.74)
VIT D+METAB SERPL-MCNC: 50 NG/ML (ref 30–100)
VLDLC SERPL CALC-MCNC: 14 MG/DL (ref 0–30)
WBC # BLD AUTO: 4 X10(3) UL (ref 4–11)

## 2023-05-24 PROCEDURE — 84443 ASSAY THYROID STIM HORMONE: CPT

## 2023-05-24 PROCEDURE — 85025 COMPLETE CBC W/AUTO DIFF WBC: CPT

## 2023-05-24 PROCEDURE — 80053 COMPREHEN METABOLIC PANEL: CPT

## 2023-05-24 PROCEDURE — 80061 LIPID PANEL: CPT

## 2023-05-24 PROCEDURE — 82306 VITAMIN D 25 HYDROXY: CPT

## 2023-05-24 PROCEDURE — 83036 HEMOGLOBIN GLYCOSYLATED A1C: CPT

## 2023-05-24 PROCEDURE — 36415 COLL VENOUS BLD VENIPUNCTURE: CPT

## 2023-10-04 RX ORDER — ACYCLOVIR 400 MG/1
400 TABLET ORAL 3 TIMES DAILY
Qty: 30 TABLET | Refills: 5 | Status: SHIPPED | OUTPATIENT
Start: 2023-10-04

## 2024-01-11 NOTE — TELEPHONE ENCOUNTER
Current Outpatient Medications:       acyclovir 400 MG Oral Tab, Take 1 tablet (400 mg total) by mouth 3 (three) times daily. PRN, Disp: 30 tablet, Rfl: 5

## 2024-01-12 NOTE — TELEPHONE ENCOUNTER
Dr. Dailey - chart shows that Dr Mann had ordered this with refills in the past.   Okay to fill? Or do you need appointment first?     Acyclovir 400mg   Take 1 tablet (400 mg total) by mouth 3 (three) times daily. PRN, Normal, Disp-30 tablet, R-5   Dispense: 30 tablet   Refills: 5 ordered   Pharmacy: [facility]/pharmacy #[id number] [address] AT [facility], [phone], [phone] (Ph: [phone])   Order Details  Ordered on: 07/29/22   Discontinued on: 10/04/23   End date: 10/03/23   Authorizing provider: Oswald Mann MD         Please Review. Protocol Failed or has no protocol.       Requested Prescriptions   Pending Prescriptions Disp Refills    acyclovir 400 MG Oral Tab 30 tablet 5     Sig: Take 1 tablet (400 mg total) by mouth 3 (three) times daily. PRN       There is no refill protocol information for this order        Recent Outpatient Visits              7 months ago Physical exam    [facility], [address], Oswald Polanco MD    Office Visit    1 year ago [facility]    [facility] Visit    1 year ago Throat congestion    [facility], Michael D, MD    Office Visit    1 year ago Physical exam    [facility], Michael D, MD    Office Visit    2 years ago Right hip pain    [facility], Michael D, MD    Office Visit

## 2024-01-12 NOTE — TELEPHONE ENCOUNTER
Medication pended and routed per protocol.     Requested Prescriptions     Pending Prescriptions Disp Refills    acyclovir 400 MG Oral Tab 30 tablet 5     Sig: Take 1 tablet (400 mg total) by mouth 3 (three) times daily. PRN       Last Office Visit with PCP: Visit date not found

## 2024-01-13 RX ORDER — ACYCLOVIR 400 MG/1
400 TABLET ORAL 3 TIMES DAILY
Qty: 30 TABLET | Refills: 0 | Status: SHIPPED | OUTPATIENT
Start: 2024-01-13

## 2024-03-25 ENCOUNTER — NURSE TRIAGE (OUTPATIENT)
Dept: FAMILY MEDICINE CLINIC | Facility: CLINIC | Age: 74
End: 2024-03-25

## 2024-03-25 ENCOUNTER — HOSPITAL ENCOUNTER (OUTPATIENT)
Dept: GENERAL RADIOLOGY | Age: 74
Discharge: HOME | End: 2024-03-25
Attending: INTERNAL MEDICINE
Payer: MEDICARE

## 2024-03-25 ENCOUNTER — OFFICE VISIT (OUTPATIENT)
Dept: INTERNAL MEDICINE CLINIC | Facility: CLINIC | Age: 74
End: 2024-03-25

## 2024-03-25 VITALS
SYSTOLIC BLOOD PRESSURE: 122 MMHG | WEIGHT: 140 LBS | HEIGHT: 61 IN | HEART RATE: 99 BPM | BODY MASS INDEX: 26.43 KG/M2 | DIASTOLIC BLOOD PRESSURE: 84 MMHG | OXYGEN SATURATION: 94 %

## 2024-03-25 DIAGNOSIS — M79.602 LEFT ARM PAIN: ICD-10-CM

## 2024-03-25 DIAGNOSIS — Z12.11 SCREENING FOR COLON CANCER: ICD-10-CM

## 2024-03-25 DIAGNOSIS — R53.83 OTHER FATIGUE: Primary | ICD-10-CM

## 2024-03-25 DIAGNOSIS — E55.9 VITAMIN D DEFICIENCY: ICD-10-CM

## 2024-03-25 DIAGNOSIS — A60.00 GENITAL HERPES SIMPLEX, UNSPECIFIED SITE: ICD-10-CM

## 2024-03-25 PROCEDURE — 73060 X-RAY EXAM OF HUMERUS: CPT | Performed by: INTERNAL MEDICINE

## 2024-03-25 PROCEDURE — 99214 OFFICE O/P EST MOD 30 MIN: CPT | Performed by: INTERNAL MEDICINE

## 2024-03-25 RX ORDER — ACYCLOVIR 400 MG/1
400 TABLET ORAL DAILY
Qty: 90 TABLET | Refills: 2 | Status: SHIPPED | OUTPATIENT
Start: 2024-03-25

## 2024-03-25 NOTE — PROGRESS NOTES
Outpatient Office Note    HPI:     Hien Zamora is a 73 year old female.  Chief Complaint   Patient presents with    Arm Pain     Left arm pain         Left arm pain  1 month  Aches  Exacerbated by lifting  Constant  5-6/10  Tylenol somewhat helped  No decrease in the range of motion in the shoulder  Has history of osteoporosis  No history of recent trauma      Also complains that she has been feeling fatigued  Was told by a friend that she walks slower and that she leans more on him while walking        Current Outpatient Medications   Medication Sig Dispense Refill    acyclovir 400 MG Oral Tab Take 1 tablet (400 mg total) by mouth daily. PRN 90 tablet 2    Multiple Vitamins-Minerals (ALGAE BASED CALCIUM) Oral Tab Take 2 tablets by mouth 2 (two) times a day. 1 tablet 0    LORATADINE 10 MG Oral Tab TAKE 1 TABLET BY MOUTH EVERY DAY (Patient not taking: Reported on 3/25/2024) 90 tablet 1    montelukast 10 MG Oral Tab Take 1 tablet (10 mg total) by mouth nightly. (Patient not taking: Reported on 3/25/2024) 30 tablet 3    aspirin 81 MG Oral Chew Tab Chew by mouth daily. (Patient not taking: Reported on 3/25/2024)        Past Medical History:   Diagnosis Date    Genital herpes     Hyperlipidemia     Osteoporosis       Past Surgical History:   Procedure Laterality Date    OTHER      cholycystectomy    OTHER      T and A      Social History:  Social History     Socioeconomic History    Marital status:    Tobacco Use    Smoking status: Never    Smokeless tobacco: Never   Vaping Use    Vaping Use: Never used   Substance and Sexual Activity    Alcohol use: Yes     Comment: socially    Drug use: Never   Other Topics Concern    Seat Belt Yes      Family History   Problem Relation Age of Onset    Other (esophageal cancer) Mother     Other (dm 2) Father     Other (cad) Father       Allergies   Allergen Reactions    Morphine SHORTNESS OF BREATH    Oxycodone SHORTNESS OF BREATH    Alendronic Acid NAUSEA ONLY     Chest  pain        REVIEW OF SYSTEMS:   Review of Systems   Review of Systems   Constitutional: Negative for activity change, appetite change and fever.   HENT: Negative for congestion and voice change.    Respiratory: Negative for cough and shortness of breath.    Cardiovascular: Negative for chest pain.   Gastrointestinal: Negative for abdominal distention, abdominal pain and vomiting.   Genitourinary: Negative for hematuria.   Skin: Negative for wound.   Psychiatric/Behavioral: Negative for behavioral problems.   Wt Readings from Last 5 Encounters:   03/25/24 140 lb (63.5 kg)   05/24/23 138 lb (62.6 kg)   08/17/22 142 lb (64.4 kg)   04/12/22 139 lb 12.8 oz (63.4 kg)   10/07/21 146 lb 9.6 oz (66.5 kg)     Body mass index is 26.45 kg/m².      EXAM:   /84   Pulse 99   Ht 5' 1\" (1.549 m)   Wt 140 lb (63.5 kg)   LMP  (LMP Unknown)   SpO2 94%   BMI 26.45 kg/m²   Physical Exam  Musculoskeletal:         General: No swelling, tenderness or deformity.        Constitutional:       Appearance: Normal appearance.   HENT:      Head: Normocephalic.   Eyes:      Conjunctiva/sclera: Conjunctivae normal.   Cardiovascular:      Rate and Rhythm: Normal rate   Pulmonary:      Effort: Pulmonary effort is normal.   Skin:     General: Skin is warm and dry.   Neurological:      General: No focal deficit present.      Mental Status: He is alert and oriented to person, place, and time. Mental status is at baseline.   Psychiatric:         Mood and Affect: Mood normal.         Behavior: Behavior normal.       Health Maintenence:     Health Maintenance Topics with due status: Overdue       Topic Date Due    Zoster Vaccines Never done    Colorectal Cancer Screening 08/05/2023    COVID-19 Vaccine 09/01/2023    Influenza Vaccine 10/01/2023    Annual Depression Screening 01/01/2024    Fall Risk Screening (Annual) 01/01/2024     Health Maintenance Topics with due status: Due Soon       Topic Date Due    Annual Physical 05/24/2024             ASSESSMENT AND PLAN:   1. Other fatigue  - TSH W Reflex To Free T4 [E]; Future  - Vitamin B12 [E]; Future  - CBC W Differential W Platelet [E]; Future  - Comp Metabolic Panel (14) [E]; Future    2. Vitamin D deficiency  - Vitamin D [E]; Future    3. Left arm pain  - XR HUMERUS (MIN 2 VIEWS), LEFT (CPT=73060); Future    4. Screening for colon cancer  - GI Telephone Colon Screen ([facility] docs); Future    5. Genital herpes simplex, unspecified site  - acyclovir 400 MG Oral Tab; Take 1 tablet (400 mg total) by mouth daily. PRN  Dispense: 90 tablet; Refill: 2        The patient indicates understanding of these issues and agrees to the plan.  No follow-ups on file.        This note was prepared using Dragon Medical voice recognition dictation software. As a result errors may occur. When identified these errors have been corrected. While every attempt is made to correct errors during dictation discrepancies may still exist.    Elvin Dailey MD

## 2024-03-25 NOTE — TELEPHONE ENCOUNTER
Action Requested: Summary for Provider     []  Critical Lab, Recommendations Needed  [] Need Additional Advice  []   FYI    []   Need Orders  [] Need Medications Sent to Pharmacy  []  Other     SUMMARY: OV today with Dr Dailey for eval left arm pain      Pt reports x 1 month of upper left arm pain.    Per pt occurs when she is lifting something heavy but \"then the pain stays with me the rest of the day\"    Pt reports pain as ache and 8/10 on pain scale.     Pt also reports sometimes left hand feels numb \"but it is mildly numb\"    Pt denies: chest pain, weakness, SOB, dizziness, headache, blurred vision, jaw pain, fever, edema at site, rash    Pt requests OV today.    OV scheduled with Dr Dailey for evaluation            Reason for call: No chief complaint on file.  Onset: Data Unavailable                       Reason for Disposition   MODERATE pain (e.g., interferes with normal activities) and present > 3 days    Protocols used: Arm Pain-A-[place]

## 2024-03-28 ENCOUNTER — TELEPHONE (OUTPATIENT)
Dept: INTERNAL MEDICINE CLINIC | Facility: CLINIC | Age: 74
End: 2024-03-28

## 2024-03-28 NOTE — TELEPHONE ENCOUNTER
Xrays done 3/25/24 - please review and advise when able - patient calling for results.     Impression   CONCLUSION:  1. Mild AC joint osteoarthritis.  Otherwise negative.

## 2024-09-24 ENCOUNTER — TELEPHONE (OUTPATIENT)
Dept: INTERNAL MEDICINE CLINIC | Facility: CLINIC | Age: 74
End: 2024-09-24

## 2024-09-24 NOTE — TELEPHONE ENCOUNTER
Called patient to discuss care gaps.  She had no idea who Dr. Reeves was and advised that she sees another physician for her care.

## 2024-10-22 ENCOUNTER — PATIENT OUTREACH (OUTPATIENT)
Dept: CASE MANAGEMENT | Age: 74
End: 2024-10-22

## 2024-10-22 NOTE — PROCEDURES
The office order for PCP removal request is Approved and finalized on October 22, 2024.    Removed Leandro Hernández MD as the patient's Primary Care Physician

## (undated) NOTE — LETTER
Patient Name: Vamsi Das  : 1950  MRN: [id number]  Patient Address: [address] [place], [place] [address]  [facility] 2019 (COVID-19)     [facility] is committed to the safety and well-being of our patients, members, em carefully. If your symptoms get worse, call your healthcare provider immediately. 3. Get rest and stay hydrated.    4. If you have a medical appointment, call the healthcare provider ahead of time and tell them that you have or may have COVID-19.  5. For m of fever-reducing medications; and  · Improvement in respiratory symptoms (e.g., cough, shortness of breath); and  · At least 10 days have passed since symptoms first appeared OR if asymptomatic patient or date of symptom onset is unclear then use 10 days donors must:    · Have had a confirmed diagnosis of COVID-19  · Be symptom-free for at least 14 days*    *Some people will be required to have a repeat COVID-19 test in order to be eligible to donate.  If you’re instructed by Rosa Isela that a repeat test is r random. Researchers are trying to identify similarities between people with a Post-COVID condition to better understand if there are risk factors. How do I prevent a Post-COVID condition?   The best way to prevent the long-term symptoms of COVID-19 is